# Patient Record
Sex: MALE | Race: BLACK OR AFRICAN AMERICAN | NOT HISPANIC OR LATINO | Employment: UNEMPLOYED | URBAN - METROPOLITAN AREA
[De-identification: names, ages, dates, MRNs, and addresses within clinical notes are randomized per-mention and may not be internally consistent; named-entity substitution may affect disease eponyms.]

---

## 2020-05-10 ENCOUNTER — APPOINTMENT (OUTPATIENT)
Dept: RADIOLOGY | Facility: MEDICAL CENTER | Age: 25
End: 2020-05-10
Attending: EMERGENCY MEDICINE
Payer: MEDICAID

## 2020-05-10 ENCOUNTER — HOSPITAL ENCOUNTER (EMERGENCY)
Facility: MEDICAL CENTER | Age: 25
End: 2020-05-10
Attending: EMERGENCY MEDICINE
Payer: MEDICAID

## 2020-05-10 VITALS
BODY MASS INDEX: 23.21 KG/M2 | OXYGEN SATURATION: 98 % | SYSTOLIC BLOOD PRESSURE: 122 MMHG | RESPIRATION RATE: 18 BRPM | DIASTOLIC BLOOD PRESSURE: 71 MMHG | HEIGHT: 65 IN | HEART RATE: 76 BPM | TEMPERATURE: 97.8 F | WEIGHT: 139.33 LBS

## 2020-05-10 DIAGNOSIS — M25.561 CHRONIC PAIN OF BOTH KNEES: ICD-10-CM

## 2020-05-10 DIAGNOSIS — G89.29 CHRONIC PAIN OF BOTH KNEES: ICD-10-CM

## 2020-05-10 DIAGNOSIS — M25.562 CHRONIC PAIN OF BOTH KNEES: ICD-10-CM

## 2020-05-10 PROCEDURE — 73564 X-RAY EXAM KNEE 4 OR MORE: CPT | Mod: RT

## 2020-05-10 PROCEDURE — 99283 EMERGENCY DEPT VISIT LOW MDM: CPT

## 2020-05-10 PROCEDURE — 73564 X-RAY EXAM KNEE 4 OR MORE: CPT | Mod: LT

## 2020-05-10 RX ORDER — ARIPIPRAZOLE 10 MG/1
10 TABLET ORAL DAILY
COMMUNITY

## 2020-05-10 RX ORDER — CEPHALEXIN 500 MG/1
500 CAPSULE ORAL 4 TIMES DAILY
Qty: 40 CAP | Refills: 0 | Status: SHIPPED | OUTPATIENT
Start: 2020-05-10 | End: 2020-05-10

## 2020-05-10 RX ORDER — HYDROCODONE BITARTRATE AND ACETAMINOPHEN 5; 325 MG/1; MG/1
1 TABLET ORAL EVERY 6 HOURS PRN
Qty: 12 TAB | Refills: 0 | Status: SHIPPED | OUTPATIENT
Start: 2020-05-10 | End: 2020-05-10

## 2020-05-10 RX ORDER — SULFAMETHOXAZOLE AND TRIMETHOPRIM 200; 40 MG/5ML; MG/5ML
10 SUSPENSION ORAL 2 TIMES DAILY
Qty: 200 ML | Refills: 0 | Status: SHIPPED | OUTPATIENT
Start: 2020-05-10 | End: 2020-05-10

## 2020-05-10 ASSESSMENT — LIFESTYLE VARIABLES
TOTAL SCORE: 0
EVER HAD A DRINK FIRST THING IN THE MORNING TO STEADY YOUR NERVES TO GET RID OF A HANGOVER: NO
HAVE YOU EVER FELT YOU SHOULD CUT DOWN ON YOUR DRINKING: NO
HAVE PEOPLE ANNOYED YOU BY CRITICIZING YOUR DRINKING: NO
CONSUMPTION TOTAL: INCOMPLETE
EVER FELT BAD OR GUILTY ABOUT YOUR DRINKING: NO
TOTAL SCORE: 0
TOTAL SCORE: 0

## 2020-05-11 NOTE — DISCHARGE INSTRUCTIONS
Use Tylenol and Motrin if needed for pain and avoid activities that may cause further injury to your knees.

## 2020-05-11 NOTE — ED PROVIDER NOTES
ED Provider Note    CHIEF COMPLAINT  Chief Complaint   Patient presents with   • Knee Pain     (B).  R>L       HPI  Nolberto Catalan is a 24 y.o. male who presents to the emergency department complaining of bilateral knee pain.  The patient has had a previous right knee ACL repair and he says for the last couple of years the knee has been very unstable and he recently moved to [place] and got a new job where he has to do a lot of walking and this is bothering his knees.  In addition he now has left knee pain after jumping over a fence yesterday and landed funny.  He says the left knee feels unstable.  The patient expects his health insurance to kick in in the next 10 days and says that he has not yet been able to see an orthopedic doctor in [place]    REVIEW OF SYSTEMS no other injury or mechanism of injury no fever or chills    PAST MEDICAL HISTORY  Past Medical History:   Diagnosis Date   • Asthma        FAMILY HISTORY  History reviewed. No pertinent family history.    SOCIAL HISTORY  Social History     Socioeconomic History   • Marital status: Single     Spouse name: Not on file   • Number of children: Not on file   • Years of education: Not on file   • Highest education level: Not on file   Occupational History   • Not on file   Social Needs   • Financial resource strain: Not on file   • Food insecurity     Worry: Not on file     Inability: Not on file   • Transportation needs     Medical: Not on file     Non-medical: Not on file   Tobacco Use   • Smoking status: Current Every Day Smoker     Packs/day: 0.50     Types: Cigarettes   • Smokeless tobacco: Never Used   Substance and Sexual Activity   • Alcohol use: Not Currently   • Drug use: Not Currently   • Sexual activity: Not on file   Lifestyle   • Physical activity     Days per week: Not on file     Minutes per session: Not on file   • Stress: Not on file   Relationships   • Social connections     Talks on phone: Not on file     Gets together: Not on file     Attends  "Anabaptism service: Not on file     Active member of club or organization: Not on file     Attends meetings of clubs or organizations: Not on file     Relationship status: Not on file   • Intimate partner violence     Fear of current or ex partner: Not on file     Emotionally abused: Not on file     Physically abused: Not on file     Forced sexual activity: Not on file   Other Topics Concern   • Not on file   Social History Narrative   • Not on file       SURGICAL HISTORY  Past Surgical History:   Procedure Laterality Date   • OTHER ORTHOPEDIC SURGERY         CURRENT MEDICATIONS  Home Medications     Reviewed by Yvette Lou R.N. (Registered Nurse) on 05/10/20 at 1737  Med List Status: Partial   Medication Last Dose Status   ARIPiprazole (ABILIFY) 10 MG Tab  Active   NS SOLN 60 mL with albuterol 2.5 mg/0.5 mL NEBU 5 mL  Active                ALLERGIES  Allergies   Allergen Reactions   • Nsaids Swelling       PHYSICAL EXAM  VITAL SIGNS: /67   Pulse 83   Temp 36.6 °C (97.8 °F) (Temporal)   Resp 16   Ht 1.651 m (5' 5\")   Wt 63.2 kg (139 lb 5.3 oz)   SpO2 100%   BMI 23.19 kg/m²    Oxygen saturation is interpreted as adequate  Constitutional: Awake verbal pleasant individual in no distress  Musculoskeletal: Both knees visually look normal there is no swelling or erythema or apparent effusion.  When the patient stands up he demonstrates how unstable his right knee is and it does seem to move and very unstable fashion but he is able to walk on it without difficulty.  There is no crepitance or limits of range of motion of the left knee no point tenderness.    Radiology  DX-KNEE COMPLETE 4+ RIGHT   Final Result         1. No acute osseous abnormality.      DX-KNEE COMPLETE 4+ LEFT   Final Result         1. No acute osseous abnormality.        MEDICAL DECISION MAKING and DISPOSITION  The patient requested that I x-rayed both knees and this was done as noted above.  At this point in time I have advised the " patient I think the best course of action is for him to see an orthopedic doctor as soon as he is able.  I have given him the name and phone number for the orthopedist on call he is to use Tylenol and Motrin and rest his knees as much as he is able and I have advised him to avoid activities that may cause knee injuries such as jumping over a fence.  If he feels he otherwise requires emergency medical care he is to return here    IMPRESSION  1.  Chronic right knee pain  2.  Left knee pain      Electronically signed by: Trevor Callejas M.D., 5/10/2020 7:13 PM

## 2020-05-11 NOTE — ED TRIAGE NOTES
Nolberto Catalan  Chief Complaint   Patient presents with   • Knee Pain     (B).  R>L     Pt ambulatory to triage with above complaint.  VSS, no acute distress.  Pt states history of torn acl and meniscus on left with sx, and now (R) knee has pain after jumping over a fence yesterday.  Weight bearing as tolerated.  CMS intact  Pt denies fever/ cough or contact with anyone positive for Covid/ Corona.  Pt/staff masked and in appropriate PPE during encounter.   Pt returned to lobby, educated on triage process, and to inform staff of any changes or concerns.